# Patient Record
Sex: MALE | NOT HISPANIC OR LATINO | URBAN - METROPOLITAN AREA
[De-identification: names, ages, dates, MRNs, and addresses within clinical notes are randomized per-mention and may not be internally consistent; named-entity substitution may affect disease eponyms.]

---

## 2017-10-09 ENCOUNTER — IMPORTED ENCOUNTER (OUTPATIENT)
Dept: URBAN - METROPOLITAN AREA CLINIC 1 | Facility: CLINIC | Age: 67
End: 2017-10-09

## 2017-10-09 PROBLEM — H18.50: Noted: 2017-10-09

## 2017-10-09 PROBLEM — H35.033: Noted: 2017-10-09

## 2017-10-09 PROBLEM — Z96.1: Noted: 2017-10-09

## 2017-10-09 PROBLEM — H26.491: Noted: 2017-10-09

## 2017-10-09 PROCEDURE — 92015 DETERMINE REFRACTIVE STATE: CPT

## 2017-10-09 PROCEDURE — 92014 COMPRE OPH EXAM EST PT 1/>: CPT

## 2017-10-09 NOTE — PATIENT DISCUSSION
1.  Map dot finger print Corneal Dystrophy OU- observe2. 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 Stable continue HTN Control3. PCO  OD (Posterior Capsule Opacification)  Observe and consider yag cap when pt feels pco visually significant and visual acuity decreases to appropriate level. 4. Pseudophakia OU - doing well MRX for glasses givenReturn for an appointment in 1 year 27 with Dr. Shazia Peguero.

## 2018-10-09 ENCOUNTER — IMPORTED ENCOUNTER (OUTPATIENT)
Dept: URBAN - METROPOLITAN AREA CLINIC 1 | Facility: CLINIC | Age: 68
End: 2018-10-09

## 2018-10-09 PROBLEM — Z96.1: Noted: 2018-10-09

## 2018-10-09 PROBLEM — H35.033: Noted: 2018-10-09

## 2018-10-09 PROBLEM — H35.041: Noted: 2018-10-09

## 2018-10-09 PROBLEM — H26.491: Noted: 2018-10-09

## 2018-10-09 PROBLEM — H18.50: Noted: 2018-10-09

## 2018-10-09 PROCEDURE — 92014 COMPRE OPH EXAM EST PT 1/>: CPT

## 2018-10-09 NOTE — PATIENT DISCUSSION
1.  GR I Hypertensive Retinopathy OU w/ secondary MAs OD - Stable continue HTN Control. 2. Map dot finger print Corneal Dystrophy OU - stable observe. 3. PCO  OD (Posterior Capsule Opacification)  Observe and consider yag cap when pt feels pco visually significant and visual acuity decreases to appropriate level. 4. Pseudophakia OU - doing well Return for an appointment in 1 year for 30 with Dr. Reyna Dietrich.

## 2019-10-10 ENCOUNTER — IMPORTED ENCOUNTER (OUTPATIENT)
Dept: URBAN - METROPOLITAN AREA CLINIC 1 | Facility: CLINIC | Age: 69
End: 2019-10-10

## 2019-10-10 PROBLEM — H35.033: Noted: 2019-10-10

## 2019-10-10 PROBLEM — H35.041: Noted: 2019-10-10

## 2019-10-10 PROCEDURE — 92015 DETERMINE REFRACTIVE STATE: CPT

## 2019-10-10 PROCEDURE — 92014 COMPRE OPH EXAM EST PT 1/>: CPT

## 2019-10-10 NOTE — PATIENT DISCUSSION
1.  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 2. MDF Corneal Dystrophy OU - stable observe. 3. PCO OD (Posterior Capsule Opacification)  Observe 4. Pseudophakia OU Letter to PCP MRx given Return for an appointment in 1 yr 30 glare with Dr. Tho Griffin.

## 2020-10-06 ENCOUNTER — IMPORTED ENCOUNTER (OUTPATIENT)
Dept: URBAN - METROPOLITAN AREA CLINIC 1 | Facility: CLINIC | Age: 70
End: 2020-10-06

## 2020-10-06 PROBLEM — H18.50: Noted: 2020-10-06

## 2020-10-06 PROBLEM — H16.143: Noted: 2020-10-06

## 2020-10-06 PROBLEM — Z96.1: Noted: 2020-10-06

## 2020-10-06 PROBLEM — H04.123: Noted: 2020-10-06

## 2020-10-06 PROBLEM — H35.033: Noted: 2020-10-06

## 2020-10-06 PROBLEM — H35.043: Noted: 2020-10-06

## 2020-10-06 PROBLEM — H18.51: Noted: 2020-10-06

## 2020-10-06 PROCEDURE — 92014 COMPRE OPH EXAM EST PT 1/>: CPT

## 2020-10-06 NOTE — PATIENT DISCUSSION
1.  GR I Hypertensive Retinopathy OU w/ Secondary MA's OU -- Stable continue HTN Control. 2.  SONIA w/ PEK OU -- The use/continuation of artificial tears were recommended. 3.  MDF Corneal Dystrophy OU -- Observe4. PCO OD (Posterior Capsule Opacification)  Observe. 5.  Pseudophakia OU Patient defers Mrx today. Letter to PCP. Return for an appointment in 1 year for a 30/glare with Dr. Jinny Garcia.

## 2021-10-05 ENCOUNTER — IMPORTED ENCOUNTER (OUTPATIENT)
Dept: URBAN - METROPOLITAN AREA CLINIC 1 | Facility: CLINIC | Age: 71
End: 2021-10-05

## 2021-10-05 PROBLEM — H35.043: Noted: 2021-10-05

## 2021-10-05 PROBLEM — Z96.1: Noted: 2021-10-05

## 2021-10-05 PROBLEM — H04.123: Noted: 2021-10-05

## 2021-10-05 PROBLEM — H35.033: Noted: 2021-10-05

## 2021-10-05 PROBLEM — H18.50: Noted: 2021-10-05

## 2021-10-05 PROBLEM — H26.493: Noted: 2021-10-05

## 2021-10-05 PROCEDURE — 92014 COMPRE OPH EXAM EST PT 1/>: CPT

## 2021-10-05 NOTE — PATIENT DISCUSSION
1.  GR I Hypertensive Retinopathy OU w/ Secondary MA's OU- Stable continue HTN Control2. Dry Eyes OU - Recommend ATs TID OU routinely 3. Map Dot Fingerprint Corneal Dystrophy OU- observe4. PCO OU: (Posterior Capsule Opacification)   Observe and consider yag cap when pt feels pco visually significant and visual acuity decreases to appropriate level. 5. Pseudophakia OU - (Standard OU)Return for an appointment in 1 year 27 with Dr. Tho Griffin.

## 2022-04-02 ASSESSMENT — VISUAL ACUITY
OS_CC: 20/40+2
OS_CC: 20/30-1
OD_GLARE: 20/30
OS_CC: 20/40-2
OS_GLARE: 20/60
OS_CC: 20/30
OD_CC: 20/25
OD_SC: 20/20
OD_CC: 20/25-2
OD_CC: 20/25
OS_CC: 20/30
OD_CC: 20/30
OS_SC: 20/20
OS_GLARE: 20/50
OD_CC: 20/25
OD_GLARE: 20/50

## 2022-04-02 ASSESSMENT — TONOMETRY
OD_IOP_MMHG: 15
OS_IOP_MMHG: 16
OS_IOP_MMHG: 15
OD_IOP_MMHG: 15
OD_IOP_MMHG: 16
OS_IOP_MMHG: 16
OS_IOP_MMHG: 15
OD_IOP_MMHG: 14
OS_IOP_MMHG: 14
OD_IOP_MMHG: 14

## 2024-05-23 ENCOUNTER — COMPREHENSIVE EXAM (OUTPATIENT)
Dept: URBAN - METROPOLITAN AREA CLINIC 1 | Facility: CLINIC | Age: 74
End: 2024-05-23

## 2024-05-23 DIAGNOSIS — H18.593: ICD-10-CM

## 2024-05-23 DIAGNOSIS — Z96.1: ICD-10-CM

## 2024-05-23 DIAGNOSIS — H35.033: ICD-10-CM

## 2024-05-23 DIAGNOSIS — H04.123: ICD-10-CM

## 2024-05-23 DIAGNOSIS — H35.043: ICD-10-CM

## 2024-05-23 DIAGNOSIS — H26.493: ICD-10-CM

## 2024-05-23 PROCEDURE — 99214 OFFICE O/P EST MOD 30 MIN: CPT

## 2024-05-23 ASSESSMENT — VISUAL ACUITY
OD_SC: 20/20-1
OS_PH: 20/20-2
OD_BAT: 20/50
OS_SC: 20/30
OU_SC: 20/25
OS_BAT: 20/40

## 2024-05-23 ASSESSMENT — TONOMETRY
OS_IOP_MMHG: 15
OD_IOP_MMHG: 14

## 2025-05-30 ENCOUNTER — COMPREHENSIVE EXAM (OUTPATIENT)
Age: 75
End: 2025-05-30

## 2025-05-30 DIAGNOSIS — H43.811: ICD-10-CM

## 2025-05-30 DIAGNOSIS — H35.043: ICD-10-CM

## 2025-05-30 DIAGNOSIS — H35.033: ICD-10-CM

## 2025-05-30 DIAGNOSIS — H18.593: ICD-10-CM

## 2025-05-30 DIAGNOSIS — H26.493: ICD-10-CM

## 2025-05-30 DIAGNOSIS — H04.123: ICD-10-CM

## 2025-05-30 DIAGNOSIS — Z96.1: ICD-10-CM

## 2025-05-30 PROCEDURE — 92014 COMPRE OPH EXAM EST PT 1/>: CPT

## 2025-05-30 PROCEDURE — 92015 DETERMINE REFRACTIVE STATE: CPT
